# Patient Record
Sex: MALE | Race: WHITE | NOT HISPANIC OR LATINO
[De-identification: names, ages, dates, MRNs, and addresses within clinical notes are randomized per-mention and may not be internally consistent; named-entity substitution may affect disease eponyms.]

---

## 2022-09-12 PROBLEM — Z00.00 ENCOUNTER FOR PREVENTIVE HEALTH EXAMINATION: Status: ACTIVE | Noted: 2022-09-12

## 2022-09-14 ENCOUNTER — TRANSCRIPTION ENCOUNTER (OUTPATIENT)
Age: 37
End: 2022-09-14

## 2022-09-14 ENCOUNTER — APPOINTMENT (OUTPATIENT)
Dept: ORTHOPEDIC SURGERY | Facility: CLINIC | Age: 37
End: 2022-09-14

## 2022-09-14 VITALS — WEIGHT: 230 LBS | BODY MASS INDEX: 28.01 KG/M2 | HEIGHT: 76 IN

## 2022-09-14 DIAGNOSIS — Z78.9 OTHER SPECIFIED HEALTH STATUS: ICD-10-CM

## 2022-09-14 DIAGNOSIS — S46.219A STRAIN OF MUSCLE, FASCIA AND TENDON OF OTHER PARTS OF BICEPS, UNSPECIFIED ARM, INITIAL ENCOUNTER: ICD-10-CM

## 2022-09-14 PROCEDURE — 99204 OFFICE O/P NEW MOD 45 MIN: CPT

## 2022-09-14 PROCEDURE — 73070 X-RAY EXAM OF ELBOW: CPT | Mod: LT

## 2022-09-14 RX ORDER — NAPROXEN 500 MG/1
500 TABLET ORAL
Qty: 40 | Refills: 0 | Status: COMPLETED | COMMUNITY
Start: 2022-09-14

## 2022-09-15 NOTE — PHYSICAL EXAM
[Pain with Flexion] : pain with flexion [NL (0)] : extension 0 degrees [Pain with Extension] : pain with extension [Pain with Pronation] : pain with pronation [Pain with Supination] : pain with supination [5___] : pronation 5[unfilled]/5 [4___] : supination 4[unfilled]/5 [Left] : left elbow [There are no fractures, subluxations or dislocations. No significant abnormalities are seen] : There are no fractures, subluxations or dislocations. No significant abnormalities are seen [] : no palpable mass [[id number]] : flexion 90 degrees

## 2022-09-15 NOTE — HISTORY OF PRESENT ILLNESS
[de-identified] : Patient is here today for the left elbow.  Patient is ambidextrous.  He works for [employer]. He denies any injury bu tabout a year ago he was lifting and noted pain. Over the past 2-3 mos the pain has been severe. Pain is along the distal bicep and into the forearm. He has severe pain with flexion of the elbow.  Denies any swelling warmth erythema.

## 2022-09-21 ENCOUNTER — APPOINTMENT (OUTPATIENT)
Dept: MRI IMAGING | Facility: CLINIC | Age: 37
End: 2022-09-21

## 2022-10-14 RX ORDER — NAPROXEN 500 MG/1
500 TABLET ORAL
Qty: 40 | Refills: 0 | Status: ACTIVE | COMMUNITY
Start: 2022-10-14 | End: 1900-01-01

## 2022-10-15 RX ORDER — METHYLPREDNISOLONE 4 MG/1
4 TABLET ORAL
Qty: 1 | Refills: 0 | Status: ACTIVE | COMMUNITY
Start: 2022-10-15 | End: 1900-01-01

## 2022-12-12 ENCOUNTER — NON-APPOINTMENT (OUTPATIENT)
Age: 37
End: 2022-12-12

## 2023-01-06 ENCOUNTER — RESULT REVIEW (OUTPATIENT)
Age: 38
End: 2023-01-06

## 2023-08-25 ENCOUNTER — NON-APPOINTMENT (OUTPATIENT)
Age: 38
End: 2023-08-25